# Patient Record
Sex: FEMALE | Race: WHITE | NOT HISPANIC OR LATINO
[De-identification: names, ages, dates, MRNs, and addresses within clinical notes are randomized per-mention and may not be internally consistent; named-entity substitution may affect disease eponyms.]

---

## 2017-02-15 ENCOUNTER — RX ONLY (OUTPATIENT)
Age: 57
Setting detail: RX ONLY
End: 2017-02-15

## 2017-03-14 ENCOUNTER — RX ONLY (OUTPATIENT)
Age: 57
Setting detail: RX ONLY
End: 2017-03-14

## 2017-05-17 ENCOUNTER — RX ONLY (OUTPATIENT)
Age: 57
Setting detail: RX ONLY
End: 2017-05-17

## 2017-05-23 ENCOUNTER — RX ONLY (OUTPATIENT)
Age: 57
Setting detail: RX ONLY
End: 2017-05-23

## 2017-06-27 ENCOUNTER — RX ONLY (OUTPATIENT)
Age: 57
Setting detail: RX ONLY
End: 2017-06-27

## 2019-01-29 PROBLEM — M54.50 LOW BACK PAIN: Status: ACTIVE | Noted: 2019-01-29

## 2019-01-29 PROBLEM — M54.16 LUMBAR RADICULOPATHY: Status: ACTIVE | Noted: 2019-01-29

## 2019-01-29 PROBLEM — Z98.1 S/P LUMBAR FUSION: Status: ACTIVE | Noted: 2019-01-29

## 2019-04-23 ENCOUNTER — OFFICE VISIT (OUTPATIENT)
Dept: NEUROLOGY | Facility: MEDICAL CENTER | Age: 59
End: 2019-04-23
Payer: MEDICAID

## 2019-04-23 VITALS
BODY MASS INDEX: 25.76 KG/M2 | OXYGEN SATURATION: 98 % | WEIGHT: 140 LBS | HEIGHT: 62 IN | DIASTOLIC BLOOD PRESSURE: 72 MMHG | SYSTOLIC BLOOD PRESSURE: 118 MMHG | HEART RATE: 72 BPM | TEMPERATURE: 97.7 F

## 2019-04-23 DIAGNOSIS — R20.2 PARESTHESIAS: ICD-10-CM

## 2019-04-23 PROCEDURE — 99205 OFFICE O/P NEW HI 60 MIN: CPT | Performed by: PSYCHIATRY & NEUROLOGY

## 2019-04-23 RX ORDER — MEDROXYPROGESTERONE ACETATE 2.5 MG/1
2.5 TABLET ORAL DAILY
COMMUNITY

## 2019-04-23 ASSESSMENT — PATIENT HEALTH QUESTIONNAIRE - PHQ9
5. POOR APPETITE OR OVEREATING: 0 - NOT AT ALL
CLINICAL INTERPRETATION OF PHQ2 SCORE: 3
SUM OF ALL RESPONSES TO PHQ QUESTIONS 1-9: 9

## 2019-04-23 NOTE — PROGRESS NOTES
CC: Right arm and leg paresthesias      HPI:    Siobhan Thrasher is a 58 y.o. female with past medical history of complicated migraine, and lumbar radiculopathy status post epidural steroid injection yesterday, who presents today in initial neurologic consultation for complaints of right arm and leg paresthesias. The patient was referred by their primary care provider, Christa Vargas M.D.      In January 2019, she developed left facial numbness that spread to the right side of her face over the course of a couple hours and then evolved to include her entire right side.  The numbness and tingling was associated with a throbbing sensation on the left side of her head.  Once the tingling resolved, throbbing also subsided.  She was evaluated at [facility] where a brain MRI apparently showed greater than expected white matter changes for her age.  Neither the reports or MRI images are available for my review today.    She reports a history of migraines which are preceded by blurry vision and spots in her visual fields. She has also experienced complicated migraines which consist of headache associated with left hand numbness and tingling.  She only gets migraines about once a year. When she does experience them, she gets very photosensitive. They can last 5 days at a time.     Her past medical history also includes eosinophilic esophagitis s/p biopsy last year which showed the presence of yeast.She was taken off of flonase and treated with diflucan. She had an esophageal stricture dilated around the same time. 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     Ms. Thrasher underwent lumbar fusion in 1990 from the L4-S1 levels performed by Dr. Vernon Triana. The following year the hardware had to be removed, and she was overall pain-free until 2017 and in 2018 she underwent L3-4 discectomy by Dr. Deuce vargas in addition to an interbody cage fusion and right-sided L3-4 lateral body fusion with L4  pedicle screw fixation.    She has had both hypothyroidism and hyperthyroidism. She has a thyroid ultrasound scheduled for this Thursday.     Ms. Thrasher denies any episodes of painful vision loss.  Is difficult for her to identify specific neurological symptoms which may have been MS related because she has had so many back issues as noted above.      ROS:   Constitutional: No fevers or chills.  Eyes: No blurry vision or eye pain.  ENT: No dysphagia or hearing loss.  Respiratory: No cough or shortness of breath.  Cardiovascular: No chest pain,+ palpitations due to hypothyroidism.  GI: No nausea, vomiting, or diarrhea.  : No urinary incontinence or dysuria.  Musculoskeletal: No joint swelling or arthralgias.  Skin: No skin rashes.  Neuro: + Headaches, denies dizziness or tremors.  Endocrine: No heat or cold intolerance. No polydipsia or polyuria.  Psych: No depression or anxiety.  Heme/Lymph: No easy bruising or swollen lymph nodes.      Past Medical History:   Past Medical History:   Diagnosis Date   • Hyperlipidemia 6/27/2012   • Hypothyroidism 6/27/2012   • Anxiety 6/27/2012   • Seasonal allergies 6/27/2012   • GERD (gastroesophageal reflux disease) 6/27/2012       Past Surgical History:   Past Surgical History:   Procedure Laterality Date   • BLOCK SELECTIVE NERVE Right 4/18/2019    Procedure: Right L3 selective nerve root block under fluoroscopy;  Surgeon: Imer Chang D.O.;  Location: SURGERY [facility];  Service: Orthopedics   • CHOLECYSTECTOMY     • LAMINOTOMY     • LUMBAR FUSION POSTERIOR     • MENISCUS REPAIR Left        Social History:   Social History     Social History   • Marital status: Single     Spouse name: N/A   • Number of children: N/A   • Years of education: N/A     Occupational History   • Not on file.     Social History Main Topics   • Smoking status: Never Smoker   • Smokeless tobacco: Never Used   • Alcohol use No      Comment: rare   • Drug use: No   • Sexual activity: Yes      "Partners: Male     Other Topics Concern   • Not on file     Social History Narrative   • No narrative on file       Family History:   Family History   Problem Relation Age of Onset   • Dementia Mother         Alzheimer's Type   • Heart Disease Mother         AAA   • Cancer Father         Lung   • Heart Disease Sister    • Thyroid Sister    • Diabetes Sister    • Rheumatologic Disease Sister         RA   • Heart Disease Sister    • Alcohol/Drug Sister    • No Known Problems Daughter        Allergies:   Allergies   Allergen Reactions   • Latex Anaphylaxis   • Penicillins Anaphylaxis   • Avelox [Moxifloxacin Hcl In Nacl] Swelling   • Azithromycin Diarrhea and Vomiting   • Codeine Vomiting   • Dicloxacillin      Facial and tongue swelling   • Neosporin [Neomycin-Bacitracin-Polymyxin]        Physical Exam:     Ambulatory Vitals  Encounter Vitals  Temperature: 36.5 °C (97.7 °F)  Temp src: Temporal  Blood Pressure: 118/72  Pulse: 72  Pulse Oximetry: 98 %  Weight: 63.5 kg (140 lb)  Height: 156.2 cm (5' 1.5\")  BMI (Calculated): 26.02    Constitutional: Well-developed, well-nourished, good hygiene. Appears stated age.  Cardiovascular: RRR, with no murmurs, rubs or gallops. No carotid bruits.   Respiratory: Lungs CTA B/L, no W/R/R.   Abdomen: Soft Non-tender to Palpation. Non-distended.  Extremities: No peripheral edema, pedal pulses intact.   Skin: Warm, dry, intact. No rashes observed.  Eyes: Sclera anicteric   Funduscopic: Optic discs flat with no evidence of papilledema or pallor.   Neurologic:   Mental Status: Awake, alert, oriented x 3.   Speech: Fluent with normal prosody.   Memory: Able to recall recent and remote events accurately.    Concentration: Attentive. Able to focus on history and follow multi-step commands.              Fund of Knowledge: Appropriate   Cranial Nerves:    CN II: PERRL. No afferent pupillary defect.    CN III, IV, VI: Good eye closure, EOMI.     CN V: Facial sensation intact and symmetric. "     CN VII: No facial asymmetry.     CN VIII: Hearing intact.     CN IX and X: Palate elevates symmetrically. Normal gag reflex.    CN XI: Symmetric shoulder shrug.     CN XII: Tongue midline.    Sensory: Intact light touch, vibration and temperature.    Coordination: No evidence of past-pointing on finger to nose testing, no dysdiadochokinesia. Heel to shin intact.             DTR's: 2+ throughout without clonus.    Babinski: Toes downgoing bilaterally.   Romberg: Negative.   Movements: No tremors observed.   Musculoskeletal:    Strength: 5/5 in upper and lower extremities bilaterally.   Gait: Steady, narrow based.    Tone: Normal bulk and tone.   Joints: No swelling.     Labs Reviewed:  WBC 8.1  Hgb 14.7  Hct 44.1  MCV 90  Platelets 440  Eos 1.3  TSH 0.219  Creat 0.86  Na 139  Cl 104  BUN 18  Total bilirubin 0.3  Alkaline phosphatase 110  AST 21  ALT 16  Total cholesterol 200  Triglycerides 41  HDL 67      Imaging:   Only report of Lumbar MRI from 3/6/19 available for review today.  Impression:  1.  Mildly progressive transitional spondylosis and stable right retrolisthesis at the L2-3 level.  Borderline to mild acquired central canal narrowing.  2.  Significant improvement in decompression of the spinal canal post fusion and discectomy at L3-4.  Persistent mild bilateral neural foraminal narrowing.  3.  Stable fusion and laminectomy at L4-5, L5-S1 levels.    Assessment/Plan:  Paresthesias  Ms. Thrasher is a 58-year-old woman with a past medical history of eosinophilic esophagitis, hypothyroidism and hyperthyroidism, anxiety, lumbar fusion, discectomy, and laminectomy procedures, and complicated migraines, who experienced left facial numbness and tingling which spread to the right side of her face and then involved her entire right side.  This was associated with a throbbing headache which resolved after her tingling resolved.  Brain MRI was reportedly abnormal and concerning for demyelination.  The MRIs  were not available for my review today.  However, her history, while concerning for possible autoimmune disease in the form of the eosinophilic esophagitis, does not sound very typical for multiple sclerosis.  I suspect that she may have had a complicated migraine on the day in question.  I have asked her to obtain her MRIs on CD for my direct review.  If she did not have an enhancing lesion to explain her symptoms at the time of the episode, I would not characterize this as a first MS attack.  We will reviewed the MRIs together at her follow-up appointment.    Plan:  1.  She will obtain her MRI images on CD and bring them back for review      Greater than 50% of this 60 minute face to face encounter was devoted to disease state counseling on multiple sclerosis and coordination of care.  Additional time was spent on review of outside labs, outside medical records, and outside imaging reports.  Please see above assessment and plan for discussion.       Marlin Peterson D.O., M.P.H  MS specialist.   Board Certified Neurologist.  Neurology Clerkship Director, [facility].    Neurology,  [facility].   Tel: [phone]  Fax: [phone]

## 2019-04-23 NOTE — ASSESSMENT & PLAN NOTE
Ms. Thrasher is a 58-year-old woman with a past medical history of eosinophilic esophagitis, hypothyroidism and hyperthyroidism, anxiety, lumbar fusion, discectomy, and laminectomy procedures, and complicated migraines, who experienced left facial numbness and tingling which spread to the right side of her face and then involved her entire right side.  This was associated with a throbbing headache which resolved after her tingling resolved.  Brain MRI was reportedly abnormal and concerning for demyelination.  The MRIs were not available for my review today.  However, her history, while concerning for possible autoimmune disease in the form of the eosinophilic esophagitis, does not sound very typical for multiple sclerosis.  I suspect that she may have had a complicated migraine on the day in question.  I have asked her to obtain her MRIs on CD for my direct review.  If she did not have an enhancing lesion to explain her symptoms at the time of the episode, I would not characterize this as a first MS attack.  We will reviewed the MRIs together at her follow-up appointment.    Plan:  1.  She will obtain her MRI images on CD and bring them back for review

## 2019-05-21 ENCOUNTER — OFFICE VISIT (OUTPATIENT)
Dept: NEUROLOGY | Facility: MEDICAL CENTER | Age: 59
End: 2019-05-21
Payer: MEDICAID

## 2019-05-21 ENCOUNTER — HOSPITAL ENCOUNTER (OUTPATIENT)
Dept: LAB | Facility: MEDICAL CENTER | Age: 59
End: 2019-05-21
Attending: PSYCHIATRY & NEUROLOGY
Payer: MEDICAID

## 2019-05-21 VITALS
TEMPERATURE: 97.4 F | SYSTOLIC BLOOD PRESSURE: 104 MMHG | BODY MASS INDEX: 25.76 KG/M2 | HEIGHT: 62 IN | DIASTOLIC BLOOD PRESSURE: 68 MMHG | OXYGEN SATURATION: 96 % | WEIGHT: 140 LBS | HEART RATE: 75 BPM

## 2019-05-21 DIAGNOSIS — R90.89 ABNORMAL BRAIN MRI: ICD-10-CM

## 2019-05-21 DIAGNOSIS — R20.2 PARESTHESIAS: ICD-10-CM

## 2019-05-21 LAB
APTT PPP: 34.1 SEC (ref 24.7–36)
HAV IGM SERPL QL IA: NEGATIVE
HBV CORE IGM SER QL: NEGATIVE
HBV SURFACE AG SER QL: NEGATIVE
HCV AB SER QL: NEGATIVE
INR PPP: 0.97 (ref 0.87–1.13)
PROTHROMBIN TIME: 13 SEC (ref 12–14.6)
TREPONEMA PALLIDUM IGG+IGM AB [PRESENCE] IN SERUM OR PLASMA BY IMMUNOASSAY: NON REACTIVE
VIT B12 SERPL-MCNC: 495 PG/ML (ref 211–911)

## 2019-05-21 PROCEDURE — 82607 VITAMIN B-12: CPT

## 2019-05-21 PROCEDURE — 85730 THROMBOPLASTIN TIME PARTIAL: CPT

## 2019-05-21 PROCEDURE — 86431 RHEUMATOID FACTOR QUANT: CPT

## 2019-05-21 PROCEDURE — 86618 LYME DISEASE ANTIBODY: CPT

## 2019-05-21 PROCEDURE — 80074 ACUTE HEPATITIS PANEL: CPT

## 2019-05-21 PROCEDURE — 36415 COLL VENOUS BLD VENIPUNCTURE: CPT

## 2019-05-21 PROCEDURE — 86780 TREPONEMA PALLIDUM: CPT

## 2019-05-21 PROCEDURE — 86235 NUCLEAR ANTIGEN ANTIBODY: CPT | Mod: 91

## 2019-05-21 PROCEDURE — 86160 COMPLEMENT ANTIGEN: CPT

## 2019-05-21 PROCEDURE — 86038 ANTINUCLEAR ANTIBODIES: CPT

## 2019-05-21 PROCEDURE — 85610 PROTHROMBIN TIME: CPT

## 2019-05-21 PROCEDURE — 99214 OFFICE O/P EST MOD 30 MIN: CPT | Performed by: PSYCHIATRY & NEUROLOGY

## 2019-05-21 NOTE — PROGRESS NOTES
CC: Right arm and leg paresthesias and abnormal brain MRI      HPI:    Siobhan Thrasher is a pleasant 58 y.o. female who presents today in Neurological follow up for an episode in January 2019 of right sided paresthesias found to have greater than expected white matter changes on brain MRI imaging.  She was seen on April 23, 2019 (please refer to my office notes from the same day for more in-depth history).  She did not have the MRI images for review at that visit. Today she returns with the CDs.    Ms. Thrasher continues has continue to experience headaches since her last visit. Motrin gives incomplete relief. The intensity of the pain is 4/10. She continues to experience quite a bit of neck and low back pain and thinks her headaches may originate from her neck. She also notes twitching of her left eyelid. She has not had any additional stroke-like episodes of numbness similar to that which prompted her ER visit in March 2019.       Review of Systems   Constitutional: Negative for chills and fever.   HENT: Negative for hearing loss and sore throat.    Eyes: Negative for blurred vision, double vision and pain.   Respiratory: Negative for cough and shortness of breath.    Cardiovascular: Negative for chest pain and palpitations.   Gastrointestinal: Negative for nausea and vomiting.   Genitourinary: Negative for dysuria and frequency.   Musculoskeletal: Positive for back pain and neck pain.   Skin: Negative for itching and rash.   Neurological: Positive for headaches. Negative for tremors.   Psychiatric/Behavioral: Positive for memory loss. Negative for depression.           Past Medical History:   Past Medical History:   Diagnosis Date   • Hyperlipidemia 6/27/2012   • Hypothyroidism 6/27/2012   • Anxiety 6/27/2012   • Seasonal allergies 6/27/2012   • GERD (gastroesophageal reflux disease) 6/27/2012       Past Surgical History:   Past Surgical History:   Procedure Laterality Date   • BLOCK SELECTIVE NERVE Right 4/18/2019  "   Procedure: Right L3 selective nerve root block under fluoroscopy;  Surgeon: Imer Chang D.O.;  Location: SURGERY [facility];  Service: Orthopedics   • CHOLECYSTECTOMY     • LAMINOTOMY     • LUMBAR FUSION POSTERIOR     • MENISCUS REPAIR Left        Social History:   Social History     Social History   • Marital status: Single     Spouse name: N/A   • Number of children: N/A   • Years of education: N/A     Occupational History   • Not on file.     Social History Main Topics   • Smoking status: Never Smoker   • Smokeless tobacco: Never Used   • Alcohol use No      Comment: rare   • Drug use: No   • Sexual activity: Yes     Partners: Male     Other Topics Concern   • Not on file     Social History Narrative   • No narrative on file       Family History:   Family History   Problem Relation Age of Onset   • Dementia Mother         Alzheimer's Type   • Heart Disease Mother         AAA   • Cancer Father         Lung   • Heart Disease Sister    • Thyroid Sister    • Diabetes Sister    • Rheumatologic Disease Sister         RA   • Heart Disease Sister    • Alcohol/Drug Sister    • No Known Problems Daughter        Allergies:   Allergies   Allergen Reactions   • Latex Anaphylaxis   • Penicillins Anaphylaxis   • Avelox [Moxifloxacin Hcl In Nacl] Swelling   • Azithromycin Diarrhea and Vomiting   • Codeine Vomiting   • Dicloxacillin      Facial and tongue swelling   • Neosporin [Neomycin-Bacitracin-Polymyxin]        Physical Exam:     Ambulatory Vitals  Encounter Vitals  Temperature: 36.3 °C (97.4 °F)  Temp src: Temporal  Blood Pressure: 104/68  Pulse: 75  Pulse Oximetry: 96 %  Weight: 63.5 kg (140 lb)  Height: 156.2 cm (5' 1.5\")  BMI (Calculated): 26.02  Constitutional: Well-developed, well-nourished, good hygiene. Appears stated age.  Cardiovascular: RRR, with no murmurs, rubs or gallops. No carotid bruits.   Respiratory: Lungs CTA B/L, no W/R/R.   Abdomen: Soft Non-tender to Palpation. Non-distended.  Extremities: No " peripheral edema, pedal pulses intact.   Skin: Warm, dry, intact. No rashes observed.  Eyes: Sclera anicteric. No nystagmus observed.   Neurologic:   Mental Status: Awake and alert.    Speech: Speech is fluent with normal prosody.   .           Fund of Knowledge: Appropriate   Cranial Nerves:    CN II: Pupils are equal and react appropriately. No APD noted.    CN III, IV, VI: Good eye closure. Extraocular movements are intact.     CN V: Facial sensation is intact and symmetric.     CN VII: No evidence of facial droop.     CN VIII: Hearing is grossly intact.    CN IX and X: Palate elevates symmetrically.    CN XI: Shoulder shrug is symmetric.     CN XII: Tongue is midline.   Sensory: Sensation is intact to vibration and temperature.    Coordination: There is no discoordination detected with finger tapping or finger to nose testing.        DTR's: Reflexes are 2+ and symmetrical.   Babinski: Toes are downgoing bilaterally.    Romberg: Deferred.   Movements: No tremors noted.  Musculoskeletal:    Strength:   Deltoids      R 5/5 L 5/5  Biceps        R 5/5 L 5/5  Triceps       R 5/5 L 5/5  Wrist Ext    R 5/5 L 5/5  Finger Flex R 5/5 L 5/5  Finger Ext   R 5/5 L 5/5  Hip Flex      R 5/5 L 5/5  Knee Flex   R 5/5 L 5/5  Knee Ext    R 5/5 L 5/5  Ankle DF    R 5/5 L 5/5  Ankle PF    R 5/5 L 5/5   Gait: Gait is narrow based and steady.    Tone: Normal bulk and tone.    Joints: No joint swelling.   Psychiatric: Mood and affect are appropriate.       Labs Reviewed:  Today I reviewed the patient's most recent MRI images with her in the examination room. I explained basic terminology of MRI's, verbalized my assessment, and answered her questions.     Imaging reviewed:   MRI of the brain with and without contrast from February 4, 2019  No acute intracranial abnormality.  Chronic periventricular white matter changes, which are somewhat out of proportion for age and therefore raising concern for an underlying demyelinating  disease.    MRI of the cervical spine with out contrast from June 7, 2018  Impression:  1.  Moderate to advanced multilevel cervical spondylosis.  This causes mild to moderate canal stenosis from C3-C4 through C5-C6 with effacement of the anterior cord but no myelomalacia.  2.  Uncovertebral and facet arthrosis are noted at multiple levels resulting in varying degrees of foraminal stenosis.    Assessment/Plan:  Abnormal brain MRI  Ms. Thrasher is a 57 yo woman with pmhx of eosinophilic esophagitis, hypothyroidism/hyperthyroidism, anxiety, lumbar fusion, discectomy, and laminectomy procedures, and likely complicated migraine, who experienced left facial numbness and tingling spreading from her right face into the entire right side of her body in February 2019.    Today we reviewed her outside MRI imaging which she has provided on CD, performed at [facility].  Her brain MRI from February 4, 2019 showed periventricular white matter changes considered somewhat out of proportion for age raising the concern for possible underlying demyelinating disease.  Cervical spine MRI from June 7, 2018 did not show any evidence of demyelination.  There were no thoracic spine MRIs to evaluate.    The main concern is that the periventricular white matter changes on her MRI imaging represents multiple sclerosis and whether the episode of facial and right-sided numbness represents a demyelinating event or clinical attack.  At this time it is not possible to say.  While I see an increase in the periventricular white matter changes over time, on serial MRI imaging, the diagnosis is not certain based solely on the imaging.  An alternative consideration would be CADASIL given her strokelike episode, white matter changes, and family history of dementia.  I recommended we pursue testing of the notch 3 gene mutation while we also perform additional testing for multiple sclerosis.  I recommended a lumbar puncture to evaluate for  the presence of oligoclonal bands or other markers of demyelination.  I also recommended thoracic spine MRI imaging.  Cord lesions would of course point toward a demyelinating process such as MS.    I have taken her MRI CDs to be loaded into our radiology system.  We will ask for them to be returned once loaded.     Plan:  1.  MRI of the thoracic spine with and without contrast ordered today  2.  Lumbar puncture to assess for cell counts, protein, glucose, multiple sclerosis profile, and oligoclonal bands.  3.  We will request the notch 3 mutation through [employer].  She would likely qualify for financial assistance through the [employer] program.      Marlin Peterson D.O., M.P.H  MS specialist.   Board Certified Neurologist.  Neurology Clerkship Director, [facility].    Neurology,  [facility].   Tel: [phone]  Fax: [phone]

## 2019-05-21 NOTE — ASSESSMENT & PLAN NOTE
Ms. Thrasher is a 59 yo woman with pmhx of eosinophilic esophagitis, hypothyroidism/hyperthyroidism, anxiety, lumbar fusion, discectomy, and laminectomy procedures, and likely complicated migraine, who experienced left facial numbness and tingling spreading from her right face into the entire right side of her body in February 2019.    Today we reviewed her outside MRI imaging which she has provided on CD, performed at [facility].  Her brain MRI from February 4, 2019 showed periventricular white matter changes considered somewhat out of proportion for age raising the concern for possible underlying demyelinating disease.  Cervical spine MRI from June 7, 2018 did not show any evidence of demyelination.  There were no thoracic spine MRIs to evaluate.    The main concern is that the periventricular white matter changes on her MRI imaging represents multiple sclerosis and whether the episode of facial and right-sided numbness represents a demyelinating event or clinical attack.  At this time it is not possible to say.  While I see an increase in the periventricular white matter changes over time, on serial MRI imaging, the diagnosis is not certain based solely on the imaging.  An alternative consideration would be CADASIL given her strokelike episode, white matter changes, and family history of dementia.  I recommended we pursue testing of the notch 3 gene mutation while we also perform additional testing for multiple sclerosis.  I recommended a lumbar puncture to evaluate for the presence of oligoclonal bands or other markers of demyelination.  I also recommended thoracic spine MRI imaging.  Cord lesions would of course point toward a demyelinating process such as MS.    I have taken her MRI CDs to be loaded into our radiology system.  We will ask for them to be returned once loaded.     Plan:  1.  MRI of the thoracic spine with and without contrast ordered today  2.  Lumbar puncture to assess for cell  counts, protein, glucose, multiple sclerosis profile, and oligoclonal bands.  3.  We will request the notch 3 mutation through [employer].  She would likely qualify for financial assistance through the [employer] program.

## 2019-05-21 NOTE — ASSESSMENT & PLAN NOTE
Ms. Thrasher is a 58-year-old woman with a past medical history of eosinophilic esophagitis, hypothyroidism and hyperthyroidism, anxiety, lumbar fusion, discectomy/laminectomy procedures, and complicated migraines, who experienced left facial numbness and tingling which spread to the right side of her face and then involved her entire right side.  This was associated with a throbbing headache which resolved after her tingling resolved.  Brain MRI was reportedly abnormal and concerning for demyelination.  I suspect that she may have had a complicated migraine on the day in question.       Plan:  1.

## 2019-05-22 DIAGNOSIS — R90.89 ABNORMAL BRAIN MRI: ICD-10-CM

## 2019-05-22 ASSESSMENT — ENCOUNTER SYMPTOMS
NECK PAIN: 1
COUGH: 0
SORE THROAT: 0
NAUSEA: 0
PALPITATIONS: 0
BLURRED VISION: 0
DOUBLE VISION: 0
EYE PAIN: 0
SHORTNESS OF BREATH: 0
DEPRESSION: 0
CHILLS: 0
TREMORS: 0
VOMITING: 0
MEMORY LOSS: 1
BACK PAIN: 1
FEVER: 0
HEADACHES: 1

## 2019-05-23 LAB
B BURGDOR AB SER IA-ACNC: 0.27 LIV (ref 0–1.2)
C3 SERPL-MCNC: 182 MG/DL (ref 88–201)
C4 SERPL-MCNC: 68 MG/DL (ref 10–40)
NUCLEAR IGG SER QL IA: ABNORMAL
RHEUMATOID FACT SER NEPH-ACNC: <10 IU/ML (ref 0–14)

## 2019-05-24 ENCOUNTER — TELEPHONE (OUTPATIENT)
Dept: NEUROLOGY | Facility: MEDICAL CENTER | Age: 59
End: 2019-05-24

## 2019-05-24 NOTE — TELEPHONE ENCOUNTER
Pt called asking for a code for notch 3 mutation through [facility].  She called [facility] and they want to know the code for it that dr Peterson will use.

## 2019-05-28 NOTE — TELEPHONE ENCOUNTER
LORENE Batista, Med Ass't   Caller: Unspecified (4 days ago,  3:38 PM)             The code is 1175 through [employer].   Dr. Peterson      Called let pt know per dr Peterson.

## 2019-05-30 LAB
ENA SS-B IGG SER IA-ACNC: 0 AU/ML (ref 0–40)
SSA52 R0ENA AB IGG Q0420: 1 AU/ML (ref 0–40)
SSA60 R0ENA AB IGG Q0419: 1 AU/ML (ref 0–40)

## 2019-06-06 ENCOUNTER — TELEPHONE (OUTPATIENT)
Dept: NEUROLOGY | Facility: MEDICAL CENTER | Age: 59
End: 2019-06-06

## 2019-06-06 NOTE — TELEPHONE ENCOUNTER
Marilou from imaging called states pt has LP scheduled on 6/11/19 and need dr Peterson to order CBC as stat for pt please

## 2019-06-10 DIAGNOSIS — E78.5 HYPERLIPIDEMIA, UNSPECIFIED HYPERLIPIDEMIA TYPE: ICD-10-CM

## 2019-06-11 ENCOUNTER — HOSPITAL ENCOUNTER (OUTPATIENT)
Facility: MEDICAL CENTER | Age: 59
End: 2019-06-11
Attending: PSYCHIATRY & NEUROLOGY
Payer: MEDICAID

## 2019-06-11 ENCOUNTER — HOSPITAL ENCOUNTER (OUTPATIENT)
Dept: RADIOLOGY | Facility: MEDICAL CENTER | Age: 59
End: 2019-06-11

## 2019-06-11 ENCOUNTER — HOSPITAL ENCOUNTER (OUTPATIENT)
Dept: RADIOLOGY | Facility: MEDICAL CENTER | Age: 59
End: 2019-06-11
Attending: PSYCHIATRY & NEUROLOGY
Payer: MEDICAID

## 2019-06-11 ENCOUNTER — HOSPITAL ENCOUNTER (OUTPATIENT)
Dept: LAB | Facility: MEDICAL CENTER | Age: 59
End: 2019-06-11
Attending: PSYCHIATRY & NEUROLOGY
Payer: MEDICAID

## 2019-06-11 DIAGNOSIS — R90.89 ABNORMAL BRAIN MRI: ICD-10-CM

## 2019-06-11 DIAGNOSIS — E78.5 HYPERLIPIDEMIA, UNSPECIFIED HYPERLIPIDEMIA TYPE: ICD-10-CM

## 2019-06-11 LAB
APTT PPP: 33.5 SEC (ref 24.7–36)
BASOPHILS # BLD AUTO: 2.3 % (ref 0–1.8)
BASOPHILS # BLD: 0.17 K/UL (ref 0–0.12)
BURR CELLS/RBC NFR CSF MANUAL: 0 %
CLARITY CSF: CLEAR
COLOR CSF: COLORLESS
COLOR SPUN CSF: COLORLESS
EOSINOPHIL # BLD AUTO: 1.57 K/UL (ref 0–0.51)
EOSINOPHIL NFR BLD: 21.1 % (ref 0–6.9)
ERYTHROCYTE [DISTWIDTH] IN BLOOD BY AUTOMATED COUNT: 41.2 FL (ref 35.9–50)
GLUCOSE CSF-MCNC: 64 MG/DL (ref 40–80)
HCT VFR BLD AUTO: 41.5 % (ref 37–47)
HGB BLD-MCNC: 14.2 G/DL (ref 12–16)
IMM GRANULOCYTES # BLD AUTO: 0.02 K/UL (ref 0–0.11)
IMM GRANULOCYTES NFR BLD AUTO: 0.3 % (ref 0–0.9)
INR PPP: 0.96 (ref 0.87–1.13)
LYMPHOCYTES # BLD AUTO: 1.67 K/UL (ref 1–4.8)
LYMPHOCYTES NFR BLD: 22.4 % (ref 22–41)
LYMPHOCYTES NFR CSF: 69 %
MCH RBC QN AUTO: 30.7 PG (ref 27–33)
MCHC RBC AUTO-ENTMCNC: 34.2 G/DL (ref 33.6–35)
MCV RBC AUTO: 89.6 FL (ref 81.4–97.8)
MONOCYTES # BLD AUTO: 0.48 K/UL (ref 0–0.85)
MONOCYTES NFR BLD AUTO: 6.5 % (ref 0–13.4)
MONONUC CELLS NFR CSF: 31 %
NEUTROPHILS # BLD AUTO: 3.53 K/UL (ref 2–7.15)
NEUTROPHILS NFR BLD: 47.4 % (ref 44–72)
NRBC # BLD AUTO: 0 K/UL
NRBC BLD-RTO: 0 /100 WBC
PLATELET # BLD AUTO: 369 K/UL (ref 164–446)
PMV BLD AUTO: 9.5 FL (ref 9–12.9)
PROT CSF-MCNC: 54 MG/DL (ref 15–45)
PROTHROMBIN TIME: 12.9 SEC (ref 12–14.6)
RBC # BLD AUTO: 4.63 M/UL (ref 4.2–5.4)
RBC # CSF: 1 CELLS/UL
SPECIMEN VOL CSF: 12.9 ML
TUBE # CSF: 3
TUBE # CSF: 3
WBC # BLD AUTO: 7.4 K/UL (ref 4.8–10.8)
WBC # CSF: 1 CELLS/UL (ref 0–10)

## 2019-06-11 PROCEDURE — 82784 ASSAY IGA/IGD/IGG/IGM EACH: CPT

## 2019-06-11 PROCEDURE — 85610 PROTHROMBIN TIME: CPT

## 2019-06-11 PROCEDURE — 82040 ASSAY OF SERUM ALBUMIN: CPT

## 2019-06-11 PROCEDURE — 86617 LYME DISEASE ANTIBODY: CPT

## 2019-06-11 PROCEDURE — 89051 BODY FLUID CELL COUNT: CPT

## 2019-06-11 PROCEDURE — 84157 ASSAY OF PROTEIN OTHER: CPT

## 2019-06-11 PROCEDURE — 86780 TREPONEMA PALLIDUM: CPT

## 2019-06-11 PROCEDURE — 82945 GLUCOSE OTHER FLUID: CPT

## 2019-06-11 PROCEDURE — 85730 THROMBOPLASTIN TIME PARTIAL: CPT

## 2019-06-11 PROCEDURE — 85025 COMPLETE CBC W/AUTO DIFF WBC: CPT

## 2019-06-11 PROCEDURE — 86235 NUCLEAR ANTIGEN ANTIBODY: CPT | Mod: 91

## 2019-06-11 PROCEDURE — 83916 OLIGOCLONAL BANDS: CPT

## 2019-06-11 PROCEDURE — 36415 COLL VENOUS BLD VENIPUNCTURE: CPT

## 2019-06-11 PROCEDURE — 82042 OTHER SOURCE ALBUMIN QUAN EA: CPT

## 2019-06-11 PROCEDURE — 62270 DX LMBR SPI PNXR: CPT

## 2019-06-12 LAB — TREPONEMA PALLIDUM IGG+IGM AB [PRESENCE] IN SERUM OR PLASMA BY IMMUNOASSAY: NON REACTIVE

## 2019-06-13 LAB
ENA SS-B IGG SER IA-ACNC: 0 AU/ML (ref 0–40)
SSA52 R0ENA AB IGG Q0420: 1 AU/ML (ref 0–40)
SSA60 R0ENA AB IGG Q0419: 2 AU/ML (ref 0–40)

## 2019-06-14 LAB
ALB CSF/SERPL: 6.9 RATIO (ref 0–9)
ALBUMIN CSF-MCNC: 32 MG/DL (ref 0–35)
ALBUMIN SERPL-MCNC: 4630 MG/DL (ref 3500–5200)
B BURGDOR IGG SER QL IB: NEGATIVE
B BURGDOR IGM SER QL IB: NEGATIVE
IGG CSF-MCNC: 1.6 MG/DL (ref 0–6)
IGG SERPL-MCNC: 684 MG/DL (ref 768–1632)
IGG SYNTH RATE SER+CSF CALC-MRATE: <0 MG/D
IGG/ALB CLEAR SER+CSF-RTO: 0.34 RATIO (ref 0.28–0.66)
IGG/ALB CSF: 0.05 RATIO (ref 0.09–0.25)
OLIGOCLONAL BANDS CSF ELPH-IMP: ABNORMAL
OLIGOCLONAL BANDS CSF ELPH-IMP: NEGATIVE
OLIGOCLONAL BANDS CSF IEF: 0 BANDS (ref 0–1)

## 2019-07-22 ENCOUNTER — OFFICE VISIT (OUTPATIENT)
Dept: NEUROLOGY | Facility: MEDICAL CENTER | Age: 59
End: 2019-07-22
Payer: MEDICAID

## 2019-07-22 VITALS
TEMPERATURE: 97.4 F | BODY MASS INDEX: 25.76 KG/M2 | OXYGEN SATURATION: 97 % | WEIGHT: 140 LBS | HEIGHT: 62 IN | HEART RATE: 92 BPM | DIASTOLIC BLOOD PRESSURE: 78 MMHG | SYSTOLIC BLOOD PRESSURE: 114 MMHG

## 2019-07-22 DIAGNOSIS — R90.89 ABNORMAL BRAIN MRI: ICD-10-CM

## 2019-07-22 DIAGNOSIS — Z82.0 FAMILY HISTORY OF ALZHEIMER'S DISEASE: ICD-10-CM

## 2019-07-22 PROCEDURE — 99215 OFFICE O/P EST HI 40 MIN: CPT | Performed by: PSYCHIATRY & NEUROLOGY

## 2019-07-22 NOTE — PROGRESS NOTES
CC: Abnormal Brain MRI.    HPI:   Ms. Siobhan Thrasher is a 59 yo F who presents in outpatient neurologic follow up for an abnormal brain MRI and stroke-like episodes. She was last seen on 5/21/19. For more comprehensive account of symptoms leading to consultation, please see my progress note from 4/23/19. Since her last visit, Ms. Thrasher underwent thoracic spine MRI at [facility]. The report states that there is no my demyelination seen.      2005 she had hearing loss after a concussion during which she bled out of her left ear. She received bilateral hearing aids.     She continues to experience headaches which seem cervicogenic in etiology with pain originating at the base of her occipital area radiating over her head and behind the eyes. Frequency is weekly. She takes Ibuprofen which does not help. She denies clear photophobia, phonophobia, or nausea. She has experienced migraines in the past, but has not had a migraine for several years. At age 20, she had migraine with aura. These recurred in 5631-8904. There has only been one very severe headache.     She denies consistent memory impairment. At work, in the past, she would have difficulty remembering what she needed to do and would have to write everything down. 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 She is mostly concerned because she has a family history of what she believes to be Alzheimer's dementia in her mother and grandmother and is convinced she may develop this herself.     Review of Systems   Constitutional: Negative for chills and fever.   Eyes: Negative for blurred vision and double vision.   Respiratory: Negative for shortness of breath.    Cardiovascular: Negative for chest pain.   Gastrointestinal: Negative for nausea and vomiting.   Musculoskeletal: Positive for back pain.   Skin: Negative for rash.   Neurological: Positive for headaches.   Psychiatric/Behavioral: Positive for memory loss.    All other ROS were negative except.        Current Outpatient Prescriptions:   •  Multiple Vitamins-Minerals (HAIR/SKIN/NAILS/BIOTIN PO), Take  by mouth., Disp: , Rfl:   •  estradiol cypionate (DEPO-ESTRADIOL) 5 MG/ML Oil, by Intramuscular route Once., Disp: , Rfl:   •  medroxyPROGESTERone (PROVERA) 2.5 MG Tab, Take 2.5 mg by mouth every day., Disp: , Rfl:   •  acyclovir (ZOVIRAX) 200 MG Cap, Take 400 mg by mouth 2 Times a Day., Disp: , Rfl:   •  albuterol (PROVENTIL) 2.5mg/3ml NEBU, 3 mL by Nebulization route every 6 hours as needed., Disp: 75 mL, Rfl: 2  •  amitriptyline (ELAVIL) 50 MG TABS, Take 50 mg by mouth 2 Times a Day., Disp: , Rfl:   •  ibuprofen (MOTRIN) 800 MG TABS, Take 800 mg by mouth every 6 hours as needed., Disp: , Rfl:   •  Cetirizine HCl (ZYRTEC ALLERGY PO), Take  by mouth., Disp: , Rfl:   •  gemfibrozil (LOPID) 600 MG TABS, Take 600 mg by mouth 2 times a day., Disp: , Rfl:   •  alprazolam (XANAX) 0.5 MG TABS, Take 0.5 mg by mouth 1 time daily as needed., Disp: , Rfl:   •  doxycycline (VIBRAMYCIN) 100 MG TABS, Take 1 tablet twice daily for 10 days (Patient not taking: Reported on 4/23/2019), Disp: 20 Tab, Rfl: 0  •  ipratropium (ATROVENT) 0.02 % SOLN, 2.5 mL by Nebulization route 4 times a day. (Patient not taking: Reported on 4/23/2019), Disp: 50 Each, Rfl: 1  •  fluticasone (FLONASE) 50 MCG/ACT nasal spray, Spray 2 Sprays in nose every day. Each Nostril (Patient not taking: Reported on 4/23/2019), Disp: 16 g, Rfl: 3  •  albuterol (VENTOLIN HFA) 108 (90 BASE) MCG/ACT AERS, Inhale 2 Puffs by mouth every four hours as needed., Disp: , Rfl:   •  levothyroxine (SYNTHROID) 25 MCG TABS, Take 25 mcg by mouth every day., Disp: , Rfl:   •  docosahexanoic acid (OMEGA 3 FA) 1000 MG CAPS, Take 1,000 mg by mouth 2 Times a Day., Disp: , Rfl:   •  omeprazole (PRILOSEC) 20 MG CPDR, Take 20 mg by mouth every day., Disp: , Rfl:     Physical Examination:  Ambulatory Vitals  Encounter Vitals  Temperature: 36.3 °C  "(97.4 °F)  Temp src: Temporal  Blood Pressure: 114/78  Pulse: 92  Pulse Oximetry: 97 %  Weight: 63.5 kg (140 lb)  Height: 156.2 cm (5' 1.5\")  BMI (Calculated): 26.02    Constitutional: Well-developed, well-nourished, good hygiene. Appears stated age.  Cardiovascular: RRR, with no murmurs, rubs or gallops. No carotid bruits.   Respiratory: Lungs CTA B/L, no W/R/R.   Abdomen: Soft Non-tender to Palpation. Non-distended.  Extremities: No peripheral edema, pedal pulses intact.   Skin: Warm, dry, intact. No rashes observed.  Eyes: Sclera anicteric. No nystagmus observed.   Neurologic:   Mental Status: Awake and alert.    Speech: Speech is fluent with normal prosody.   .           Fund of Knowledge: Appropriate   Cranial Nerves:    CN II: Pupils are equal and react appropriately. No APD noted.    CN III, IV, VI: Good eye closure. Extraocular movements are intact.     CN V: Facial sensation is intact and symmetric.     CN VII: No evidence of facial droop.     CN VIII: Hearing is grossly intact.    CN IX and X: Palate elevates symmetrically.    CN XI: Shoulder shrug is symmetric.     CN XII: Tongue is midline.   Sensory: Sensation is intact to vibration and temperature.    Coordination: There is no discoordination detected with finger tapping or finger to nose testing.        DTR's: Reflexes are 2+ and symmetrical.   Babinski: Toes are downgoing bilaterally.    Romberg: Deferred.   Movements: No tremors noted.  Musculoskeletal:    Strength:   Deltoids      R 5/5 L 5/5  Biceps        R 5/5 L 5/5  Triceps       R 5/5 L 5/5  Wrist Ext    R 5/5 L 5/5  Finger Flex R 5/5 L 5/5  Finger Ext   R 5/5 L 5/5  Hip Flex      R 5/5 L 5/5  Knee Flex   R 5/5 L 5/5  Knee Ext    R 5/5 L 5/5  Ankle DF    R 5/5 L 5/5  Ankle PF    R 5/5 L 5/5   Gait: Gait is narrow based and steady.    Tone: Normal bulk and tone.    Joints: No joint swelling.   Psychiatric: Mood and affect are appropriate.     Labs Reviewed:   Results for MACIEJ CAMPBELL " (MRN [id number]) as of 7/27/2019 19:02   Ref. Range 6/11/2019 16:23   Number Of Tubes Unknown 3   Volume Latest Units: mL 12.9   Color-Body Fluid Unknown Colorless   Character-Body Fluid Unknown Clear   Supernatant Appearance Unknown Colorless   Total WBC Count Latest Ref Range: 0 - 10 cells/uL 1   Total RBC Count Latest Units: cells/uL 1   Crenated RBC Latest Units: % 0   Lymphs Latest Units: % 69   Mononuclear Cells - CSF Latest Units: % 31   CSF Tube Number Unknown 3   Glucose CSF Latest Ref Range: 40 - 80 mg/dL 64   Total Protein, CSF Latest Ref Range: 15 - 45 mg/dL 54 (H)   IgG CSF Latest Ref Range: 0.0 - 6.0 mg/dL 1.6   Results for MACIEJ CAMPBELL (MRN [id number]) as of 7/27/2019 19:02   Ref. Range 6/11/2019 16:23   Immunoglobulin G Latest Ref Range: 768 - 1632 mg/dL 684 (L)   B. Burgdorferi IgG WB Latest Ref Range: Negative  Negative   B. burgdorferi IgM Latest Ref Range: Negative  Negative   Syphilis, Treponemal Qual Latest Ref Range: Non Reactive  Non Reactive   SSA 52 (R0)(DENISHA) Ab, IgG Latest Ref Range: 0 - 40 AU/mL 1   SSA 60 (R0)(DENISHA) Ab, IgG Latest Ref Range: 0 - 40 AU/mL 2   Sjogren'S Anti-Ss-B Latest Ref Range: 0 - 40 AU/mL 0   CSF Albumin Latest Ref Range: 0 - 35 mg/dL 32   Serum Albumin Latest Ref Range: 3500 - 5200 mg/dL 4630   Albumin Ratio Latest Ref Range: 0.09 - 0.25 ratio 0.05 (L)   Albumin Index Latest Ref Range: 0.0 - 9.0 ratio 6.9   IgG Ratio Latest Ref Range: 0.28 - 0.66 ratio 0.34   Cns IgG Syn Latest Ref Range: <=8.0 mg/d <0.0   Oligoclonal Bands CSF Latest Ref Range: 0 - 1 Bands 0   Oligoclonal Bands CSF Latest Ref Range: Negative  Negative   Interpretation Unknown See Note         Assessment and Plan:     Abnormal brain MRI  Ms. Campbell is a 57 yo woman with pmhx of eosinophilic esophagitis, hypothyroidism/hyperthyroidism, anxiety, lumbar fusion, discectomy, and laminectomy procedures, and likely complicated migraine, who experienced left facial numbness and tingling spreading from her  right face into the entire right side of her body in February 2019.    Brain MRI imaging from [facility] February 4, 2019 showed periventricular white matter changes considered somewhat out of proportion for age raising the concern for possible underlying demyelinating disease.  Cervical spine MRI from June 7, 2018 did not show any evidence of demyelination. We additionally reviewed her thoracic spine MRI today which did not show any demyelination. Her CSF studies were also reviewed and did not show any oligoclonal bands in the CSF.    Given her headaches and stroke like episode, CADASIL/CARASIL was discussed, but she lost the information and order on the test.       Plan:  1.  Will wait for appointment with Dr. Solomon until submitting orders for genetic testing.   2. I will provide Dr. Solomon with Ms. Thrasher's MRI CD's for review.   3. Repeat brain MRI in 6-8 months, sooner if any new symptoms occur. She will need a new neurologist to follow her and assess for additional areas of demyeliantion.     Family history of Alzheimer's disease  She would like advice on genetic testing given family history of Alzheimer's. I recommended she consult with Dr. Solomon and have placed a referral for her. We can then order any recommended tests through [place].       Marlin Peterson D.O., M.P.H  MS specialist.   Board Certified Neurologist.  Neurology Clerkship Director, [facility].    Neurology,  [facility].   Tel: [phone]  Fax: [phone]

## 2019-07-27 PROBLEM — Z82.0 FAMILY HISTORY OF ALZHEIMER'S DISEASE: Status: ACTIVE | Noted: 2019-07-27

## 2019-07-27 ASSESSMENT — ENCOUNTER SYMPTOMS
BLURRED VISION: 0
DOUBLE VISION: 0
SHORTNESS OF BREATH: 0
MEMORY LOSS: 1
CHILLS: 0
BACK PAIN: 1
FEVER: 0
HEADACHES: 1
VOMITING: 0
NAUSEA: 0

## 2019-07-28 NOTE — ASSESSMENT & PLAN NOTE
She would like advice on genetic testing given family history of Alzheimer's. I recommended she consult with Dr. Solomon and have placed a referral for her. We can then order any recommended tests through [place].

## 2019-07-28 NOTE — ASSESSMENT & PLAN NOTE
Ms. Thrasher is a 57 yo woman with pmhx of eosinophilic esophagitis, hypothyroidism/hyperthyroidism, anxiety, lumbar fusion, discectomy, and laminectomy procedures, and likely complicated migraine, who experienced left facial numbness and tingling spreading from her right face into the entire right side of her body in February 2019.    Brain MRI imaging from [facility] February 4, 2019 showed periventricular white matter changes considered somewhat out of proportion for age raising the concern for possible underlying demyelinating disease.  Cervical spine MRI from June 7, 2018 did not show any evidence of demyelination. We additionally reviewed her thoracic spine MRI today which did not show any demyelination. Her CSF studies were also reviewed and did not show any oligoclonal bands in the CSF.    Given her headaches and stroke like episode, CADASIL/CARASIL was discussed, but she lost the information and order on the test.       Plan:  1.  Will wait for appointment with Dr. Solomon until submitting orders for genetic testing.   2. I will provide Dr. Solomon with Ms. Thrasher's MRI CD's for review.   3. Repeat brain MRI in 6-8 months, sooner if any new symptoms occur. She will need a new neurologist to follow her and assess for additional areas of demyeliantion.

## 2019-11-21 ENCOUNTER — OFFICE VISIT (OUTPATIENT)
Dept: NEUROLOGY | Facility: MEDICAL CENTER | Age: 59
End: 2019-11-21
Payer: MEDICAID

## 2019-11-21 ENCOUNTER — APPOINTMENT (OUTPATIENT)
Dept: NEUROLOGY | Facility: MEDICAL CENTER | Age: 59
End: 2019-11-21
Payer: MEDICAID

## 2019-11-21 VITALS
TEMPERATURE: 97.2 F | WEIGHT: 162.6 LBS | RESPIRATION RATE: 16 BRPM | HEIGHT: 61 IN | HEART RATE: 106 BPM | BODY MASS INDEX: 30.7 KG/M2 | DIASTOLIC BLOOD PRESSURE: 66 MMHG | SYSTOLIC BLOOD PRESSURE: 130 MMHG | OXYGEN SATURATION: 98 %

## 2019-11-21 DIAGNOSIS — R90.89 ABNORMAL BRAIN MRI: ICD-10-CM

## 2019-11-21 DIAGNOSIS — G31.09 OTHER FRONTOTEMPORAL DEMENTIA (CODE): ICD-10-CM

## 2019-11-21 DIAGNOSIS — G37.9 DEMYELINATING CHANGES IN BRAIN (HCC): ICD-10-CM

## 2019-11-21 DIAGNOSIS — R41.3 MEMORY LOSS: ICD-10-CM

## 2019-11-21 PROCEDURE — 99215 OFFICE O/P EST HI 40 MIN: CPT

## 2019-11-21 RX ORDER — MEMANTINE HYDROCHLORIDE 5 MG/1
5 TABLET ORAL 2 TIMES DAILY
Qty: 90 TAB | Refills: 3 | Status: SHIPPED | OUTPATIENT
Start: 2019-11-21 | End: 2022-12-19

## 2019-11-21 RX ORDER — FLUOXETINE 10 MG/1
CAPSULE ORAL
Refills: 2 | COMMUNITY
Start: 2019-11-11 | End: 2022-12-19

## 2019-11-21 NOTE — PROGRESS NOTES
Neurocogniitve Evaluation     Referred by Joaquín Alvarado MD for cognitive dysfunction    CC Memory loss, word finding difficulties, trouble focusing   Follow up  Used to follow with Dr meraz :     2005 she had hearing loss after a concussion during which she bled out of her left ear. She received bilateral hearing aids.      She continues to experience headaches which seem cervicogenic in etiology with pain originating at the base of her occipital area radiating over her head and behind the eyes. Frequency is weekly. She takes Ibuprofen which does not help. She denies clear photophobia, phonophobia, or nausea. She has experienced migraines in the past, but has not had a migraine for several years. At age 20, she had migraine with aura. These recurred in [id number]. There has only been one very severe headache.      She denies consistent memory impairment. At work, in the past, she would have difficulty remembering what she needed to do and would have to write everything down. 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 She is mostly concerned because she has a family history of what she believes to be Alzheimer's dementia in her mother and grandmother and is convinced she may develop this herself.     Trouble finding words when she speaks,knows what she wants to say.  Hearing loss and vision changes are bothering her. Both ears- she is having them checked.  Family history of alzheimer's disease mom had it   In 2010 pancreatic pseudocyst   MRI brain with WM abnormalities which were thought to may have represent MS however this was not the case after spinal tap and negative MS workup.  Short term memory loss - she forgets names and appointments  Can still do all her ADL's no issues driving cooking paying bills.  Some anxiety regarding FH of AD as she believes this may be familiar?    Review of Systems   Constitutional: Positive for malaise/fatigue.   HENT: Negative.    Eyes: Positive for  blurred vision.   Respiratory: Negative.    Cardiovascular: Negative.    Gastrointestinal: Negative.    Genitourinary: Negative.    Musculoskeletal: Negative.    Skin: Negative.    Neurological: Positive for tingling, sensory change, speech change and headaches.   Endo/Heme/Allergies: Negative.    Psychiatric/Behavioral: Positive for memory loss. The patient is nervous/anxious.      Past Medical History:   Diagnosis Date   • Anxiety 6/27/2012   • GERD (gastroesophageal reflux disease) 6/27/2012   • Hyperlipidemia 6/27/2012   • Hypothyroidism 6/27/2012   • Psychiatric problem     anxiety, improved since pt left job   • Seasonal allergies 6/27/2012     Past Surgical History:   Procedure Laterality Date   • BLOCK SELECTIVE NERVE Right 5/23/2019    Procedure: Right L2 selective nerve root block under fluoroscopy.  **LATEX ALLERGY**;  Surgeon: Imer Chang D.O.;  Location: [facility];  Service: Orthopedics   • BLOCK SELECTIVE NERVE Right 4/18/2019    Procedure: Right L3 selective nerve root block under fluoroscopy;  Surgeon: Imer Chang D.O.;  Location: [facility];  Service: Orthopedics   • CHOLECYSTECTOMY     • LAMINOTOMY     • LUMBAR FUSION POSTERIOR     • MENISCUS REPAIR Left      Family History   Problem Relation Age of Onset   • Dementia Mother         Alzheimer's Type   • Heart Disease Mother         AAA   • Cancer Father         Lung   • Heart Disease Sister    • Thyroid Sister    • Diabetes Sister    • Rheumatologic Disease Sister         RA   • Heart Disease Sister    • Alcohol/Drug Sister    • No Known Problems Daughter      Social History     Socioeconomic History   • Marital status: Single     Spouse name: Not on file   • Number of children: Not on file   • Years of education: Not on file   • Highest education level: Not on file   Occupational History   • Not on file   Social Needs   • Financial resource strain: Not on file   • Food insecurity:     Worry: Not on file     Inability:  Not on file   • Transportation needs:     Medical: Not on file     Non-medical: Not on file   Tobacco Use   • Smoking status: Never Smoker   • Smokeless tobacco: Never Used   Substance and Sexual Activity   • Alcohol use: No     Comment: rare   • Drug use: No   • Sexual activity: Yes     Partners: Male   Lifestyle   • Physical activity:     Days per week: Not on file     Minutes per session: Not on file   • Stress: Not on file   Relationships   • Social connections:     Talks on phone: Not on file     Gets together: Not on file     Attends Jewish service: Not on file     Active member of club or organization: Not on file     Attends meetings of clubs or organizations: Not on file     Relationship status: Not on file   • Intimate partner violence:     Fear of current or ex partner: Not on file     Emotionally abused: Not on file     Physically abused: Not on file     Forced sexual activity: Not on file   Other Topics Concern   • Not on file   Social History Narrative   • Not on file     Current Outpatient Medications on File Prior to Visit   Medication Sig Dispense Refill   • FLUoxetine (PROZAC) 10 MG Cap TAKE 1 CAPSULE BY MOUTH ONCE DAILY FOR 30 DAYS  2   • Multiple Vitamins-Minerals (HAIR/SKIN/NAILS/BIOTIN PO) Take  by mouth.     • estradiol cypionate (DEPO-ESTRADIOL) 5 MG/ML Oil by Intramuscular route Once.     • medroxyPROGESTERone (PROVERA) 2.5 MG Tab Take 2.5 mg by mouth every day.     • acyclovir (ZOVIRAX) 200 MG Cap Take 400 mg by mouth 2 Times a Day.     • ipratropium (ATROVENT) 0.02 % SOLN 2.5 mL by Nebulization route 4 times a day. 50 Each 1   • albuterol (PROVENTIL) 2.5mg/3ml NEBU 3 mL by Nebulization route every 6 hours as needed. 75 mL 2   • fluticasone (FLONASE) 50 MCG/ACT nasal spray Spray 2 Sprays in nose every day. Each Nostril 16 g 3   • amitriptyline (ELAVIL) 50 MG TABS Take 100 mg by mouth every bedtime.     • albuterol (VENTOLIN HFA) 108 (90 BASE) MCG/ACT AERS Inhale 2 Puffs by mouth every  "four hours as needed.     • ibuprofen (MOTRIN) 800 MG TABS Take 800 mg by mouth every 6 hours as needed.     • Cetirizine HCl (ZYRTEC ALLERGY PO) Take  by mouth.     • gemfibrozil (LOPID) 600 MG TABS Take 600 mg by mouth 2 times a day.     • alprazolam (XANAX) 0.5 MG TABS Take 0.5 mg by mouth 1 time daily as needed.     • omeprazole (PRILOSEC) 20 MG CPDR Take 20 mg by mouth every day.     • doxycycline (VIBRAMYCIN) 100 MG TABS Take 1 tablet twice daily for 10 days (Patient not taking: Reported on 4/23/2019) 20 Tab 0   • levothyroxine (SYNTHROID) 25 MCG TABS Take 25 mcg by mouth every day.     • docosahexanoic acid (OMEGA 3 FA) 1000 MG CAPS Take 1,000 mg by mouth 2 Times a Day.       No current facility-administered medications on file prior to visit.      Encounter Vitals  Standard Vitals  Vitals  Blood Pressure: 130/66  Temperature: 36.2 °C (97.2 °F)  Temp src: Temporal  Pulse: (!) 106  Respiration: 16  Pulse Oximetry: 98 %  Height: 154.9 cm (5' 1\")  Weight: 73.8 kg (162 lb 9.6 oz)  Encounter Vitals  Temperature: 36.2 °C (97.2 °F)  Temp src: Temporal  Blood Pressure: 130/66  Pulse: (!) 106  Respiration: 16  Pulse Oximetry: 98 %  Weight: 73.8 kg (162 lb 9.6 oz)  Height: 154.9 cm (5' 1\")  BMI (Calculated): 30.72  Physical exam  Constitutional: Well-developed, well-nourished, good hygiene. Appears stated age.  Cardiovascular: RRR, with no murmurs, rubs or gallops. No carotid bruits.   Respiratory: Lungs CTA B/L, no W/R/R.   Abdomen: Soft Non-tender to Palpation. Non-distended.  Extremities: No peripheral edema, pedal pulses intact.   Skin: Warm, dry, intact. No rashes observed.  Eyes: Sclera anicteric. No nystagmus observed.   Neurologic:              Mental Status: Awake and alert.               Speech: Speech is fluent with normal prosody.   .           Fund of Knowledge: Appropriate              Cranial Nerves:                          CN II: Pupils are equal and react appropriately. No APD noted.                   "        CN III, IV, VI: Good eye closure. Extraocular movements are intact.                           CN V: Facial sensation is intact and symmetric.                           CN VII: No evidence of facial droop.                           CN VIII: Hearing is grossly intact.                          CN IX and X: Palate elevates symmetrically.                          CN XI: Shoulder shrug is symmetric.                           CN XII: Tongue is midline.              Sensory: Sensation is intact to vibration and temperature.               Coordination: There is no discoordination detected with finger tapping or finger to nose testing.                   DTR's: Reflexes are 2+ and symmetrical.              Babinski: Toes are downgoing bilaterally.               Romberg: Deferred.              Movements: No tremors noted.  Musculoskeletal:               Strength:   Deltoids      R 5/5 L 5/5  Biceps        R 5/5 L 5/5  Triceps       R 5/5 L 5/5  Wrist Ext    R 5/5 L 5/5  Finger Flex R 5/5 L 5/5  Finger Ext   R 5/5 L 5/5  Hip Flex      R 5/5 L 5/5  Knee Flex   R 5/5 L 5/5  Knee Ext    R 5/5 L 5/5  Ankle DF    R 5/5 L 5/5  Ankle PF    R 5/5 L 5/5              Gait: Gait is narrow based and steady.               Tone: Normal bulk and tone.               Joints: No joint swelling.   Psychiatric: Mood and affect are appropriate.        Lab Results   Component Value Date/Time    SODIUM 143 07/04/2010 05:00 AM    POTASSIUM 3.9 07/04/2010 05:00 AM    CHLORIDE 107 07/04/2010 05:00 AM    CO2 27 07/04/2010 05:00 AM    GLUCOSE 97 07/04/2010 05:00 AM    BUN <5 (L) 07/04/2010 05:00 AM    CREATININE 0.55 07/04/2010 05:00 AM      Lab Results   Component Value Date/Time    WBC 7.4 06/11/2019 11:42 AM    RBC 4.63 06/11/2019 11:42 AM    HEMOGLOBIN 14.2 06/11/2019 11:42 AM    HEMATOCRIT 41.5 06/11/2019 11:42 AM    MCV 89.6 06/11/2019 11:42 AM    MCH 30.7 06/11/2019 11:42 AM    MCHC 34.2 06/11/2019 11:42 AM    MPV 9.5 06/11/2019 11:42 AM     NEUTSPOLYS 47.40 06/11/2019 11:42 AM    LYMPHOCYTES 22.40 06/11/2019 11:42 AM    MONOCYTES 6.50 06/11/2019 11:42 AM    EOSINOPHILS 21.10 (H) 06/11/2019 11:42 AM    BASOPHILS 2.30 (H) 06/11/2019 11:42 AM      Imaging  Reviewed prior MRI brain   This does not appear to be consistent with CADASIL     MOCA 25/30     Laurenceon and plan   Ms. Thrasher is a 57 yo woman with pmhx of eosinophilic esophagitis, hypothyroidism/hyperthyroidism, anxiety, lumbar fusion, discectomy, and laminectomy procedures, and likely complicated migraine, who experienced left facial numbness and tingling spreading from her right face into the entire right side of her body in February 2019.     Brain MRI imaging from [facility] February 4, 2019 showed periventricular white matter changes considered somewhat out of proportion for age raising the concern for possible underlying demyelinating disease.  Cervical spine MRI from June 7, 2018 did not show any evidence of demyelination. We additionally reviewed her thoracic spine MRI today which did not show any demyelination. Her CSF studies were also reviewed and did not show any oligoclonal bands in the CSF.  Her MOCA is 25/30 with some anxiety related to FH of AD I had discussed this is very rare. Imaging in my opinion does not raise concern for CADASIL however will order new MRI brain.    Plan  Referral to neuropsychology for cognitive testing   MRI brain   PET FDG  memantine 5 mg bid   This may be pseudodementia vs amnestic MCI vs other   We will compare prior and new MRI brain for any new stroke like lesions.  Check B12, folate TSH     RTC after above

## 2019-11-25 ASSESSMENT — ENCOUNTER SYMPTOMS
TINGLING: 1
SPEECH CHANGE: 1
MUSCULOSKELETAL NEGATIVE: 1
BLURRED VISION: 1
CARDIOVASCULAR NEGATIVE: 1
MEMORY LOSS: 1
RESPIRATORY NEGATIVE: 1
GASTROINTESTINAL NEGATIVE: 1
NERVOUS/ANXIOUS: 1
SENSORY CHANGE: 1
HEADACHES: 1

## 2019-11-29 ENCOUNTER — PATIENT MESSAGE (OUTPATIENT)
Dept: NEUROLOGY | Facility: MEDICAL CENTER | Age: 59
End: 2019-11-29

## 2019-12-02 ENCOUNTER — PATIENT MESSAGE (OUTPATIENT)
Dept: NEUROLOGY | Facility: MEDICAL CENTER | Age: 59
End: 2019-12-02

## 2019-12-02 NOTE — PATIENT COMMUNICATION
memantine (NAMENDA) 5 MG Tab 90 Tab 3/3 11/21/2019     Sig - Route: Take 1 Tab by mouth 2 times a day. - Oral    Sent to pharmacy as: Memantine HCl 5 MG Oral Tablet (NAMENDA)      Called script to [facility] at patient's request. Gave #[id number]  90 day supply  with 2 refills.

## 2019-12-18 ENCOUNTER — HOSPITAL ENCOUNTER (OUTPATIENT)
Dept: RADIOLOGY | Facility: MEDICAL CENTER | Age: 59
End: 2019-12-18
Payer: MEDICAID

## 2019-12-18 DIAGNOSIS — G31.09 OTHER FRONTOTEMPORAL DEMENTIA (CODE): ICD-10-CM

## 2019-12-18 PROCEDURE — A9552 F18 FDG: HCPCS

## 2019-12-21 ENCOUNTER — HOSPITAL ENCOUNTER (OUTPATIENT)
Dept: RADIOLOGY | Facility: MEDICAL CENTER | Age: 59
End: 2019-12-21
Payer: MEDICAID

## 2019-12-21 DIAGNOSIS — G37.9 DEMYELINATING CHANGES IN BRAIN (HCC): ICD-10-CM

## 2019-12-21 DIAGNOSIS — R90.89 ABNORMAL BRAIN MRI: ICD-10-CM

## 2019-12-21 DIAGNOSIS — R41.3 MEMORY LOSS: ICD-10-CM

## 2019-12-21 PROCEDURE — 700117 HCHG RX CONTRAST REV CODE 255

## 2019-12-21 PROCEDURE — 70553 MRI BRAIN STEM W/O & W/DYE: CPT

## 2019-12-21 PROCEDURE — A9576 INJ PROHANCE MULTIPACK: HCPCS

## 2019-12-21 RX ADMIN — GADOTERIDOL 15 ML: 279.3 INJECTION, SOLUTION INTRAVENOUS at 13:10

## 2020-02-18 ENCOUNTER — OFFICE VISIT (OUTPATIENT)
Dept: NEUROLOGY | Facility: MEDICAL CENTER | Age: 60
End: 2020-02-18
Payer: MEDICAID

## 2020-02-18 VITALS
DIASTOLIC BLOOD PRESSURE: 70 MMHG | HEIGHT: 61 IN | WEIGHT: 174 LBS | SYSTOLIC BLOOD PRESSURE: 128 MMHG | OXYGEN SATURATION: 94 % | TEMPERATURE: 97.5 F | RESPIRATION RATE: 16 BRPM | BODY MASS INDEX: 32.85 KG/M2 | HEART RATE: 114 BPM

## 2020-02-18 DIAGNOSIS — R90.89 ABNORMAL BRAIN MRI: ICD-10-CM

## 2020-02-18 DIAGNOSIS — R41.3 MEMORY LOSS: ICD-10-CM

## 2020-02-18 DIAGNOSIS — Z82.0 FAMILY HISTORY OF ALZHEIMER'S DISEASE: ICD-10-CM

## 2020-02-18 PROCEDURE — 99213 OFFICE O/P EST LOW 20 MIN: CPT

## 2020-02-18 RX ORDER — DONEPEZIL HYDROCHLORIDE 5 MG/1
5 TABLET, FILM COATED ORAL EVERY EVENING
Qty: 90 TAB | Refills: 3 | Status: SHIPPED | OUTPATIENT
Start: 2020-02-18 | End: 2022-12-19

## 2020-02-18 RX ORDER — PANTOPRAZOLE SODIUM 40 MG/1
TABLET, DELAYED RELEASE ORAL
COMMUNITY
Start: 2020-02-05

## 2020-02-18 RX ORDER — TRAMADOL HYDROCHLORIDE 50 MG/1
50 TABLET ORAL EVERY 6 HOURS PRN
COMMUNITY
Start: 2020-02-04 | End: 2022-12-19

## 2020-02-19 NOTE — PROGRESS NOTES
"CC: Follow up for memory loss       HPI:  Siobhan is here for a follow up after her MRI brain and PET scan.  She has memory loss, ADD and would sometimes be \"short tempered\".  Her main problem remains short term memory loss, would forget names, appointments, asks same questions over and over again.  She has anxiety which is now improved. Mild depression.  No true disinhibited or odd behavior, no hallucinations. No tremors, shuffling,no parkinsonism.  Her mom has AD and is now in her 80's. She developed the symptoms 10 years ago. Sister has alcohol problem.    ROS:   Constitutional: No fevers or chills.  Eyes: No blurry vision or eye pain.  ENT: No dysphagia or hearing loss.  Respiratory: No cough or shortness of breath.  Cardiovascular: No chest pain or palpitations.  GI: No nausea, vomiting, or diarrhea.  : No urinary incontinence or dysuria.  Musculoskeletal: No joint swelling or arthralgias.  Skin: No skin rashes.  Neuro: as above.  Endocrine: No heat or cold intolerance. No polydipsia or polyuria.  Psych: as above   Heme/Lymph: No easy bruising or swollen lymph nodes.      Past Medical History:   Past Medical History:   Diagnosis Date   • Anxiety 6/27/2012   • GERD (gastroesophageal reflux disease) 6/27/2012   • Hyperlipidemia 6/27/2012   • Hypothyroidism 6/27/2012   • Psychiatric problem     anxiety, improved since pt left job   • Seasonal allergies 6/27/2012       Past Surgical History:  Past Surgical History:   Procedure Laterality Date   • BLOCK SELECTIVE NERVE Right 5/23/2019    Procedure: Right L2 selective nerve root block under fluoroscopy.  **LATEX ALLERGY**;  Surgeon: Imer Chang D.O.;  Location: SURGERY [facility];  Service: Orthopedics   • BLOCK SELECTIVE NERVE Right 4/18/2019    Procedure: Right L3 selective nerve root block under fluoroscopy;  Surgeon: Imer Chang D.O.;  Location: SURGERY [facility];  Service: Orthopedics   • CHOLECYSTECTOMY     • LAMINOTOMY     • LUMBAR FUSION POSTERIOR   "   • MENISCUS REPAIR Left        Social History:   Social History     Socioeconomic History   • Marital status: Single     Spouse name: Not on file   • Number of children: Not on file   • Years of education: Not on file   • Highest education level: Not on file   Occupational History   • Not on file   Social Needs   • Financial resource strain: Not on file   • Food insecurity     Worry: Not on file     Inability: Not on file   • Transportation needs     Medical: Not on file     Non-medical: Not on file   Tobacco Use   • Smoking status: Never Smoker   • Smokeless tobacco: Never Used   Substance and Sexual Activity   • Alcohol use: No     Comment: rare   • Drug use: No   • Sexual activity: Yes     Partners: Male   Lifestyle   • Physical activity     Days per week: Not on file     Minutes per session: Not on file   • Stress: Not on file   Relationships   • Social connections     Talks on phone: Not on file     Gets together: Not on file     Attends [employer] service: Not on file     Active member of club or organization: Not on file     Attends meetings of clubs or organizations: Not on file     Relationship status: Not on file   • Intimate partner violence     Fear of current or ex partner: Not on file     Emotionally abused: Not on file     Physically abused: Not on file     Forced sexual activity: Not on file   Other Topics Concern   • Not on file   Social History Narrative   • Not on file       Family History:   Family History   Problem Relation Age of Onset   • Dementia Mother         Alzheimer's Type   • Heart Disease Mother         AAA   • Cancer Father         Lung   • Heart Disease Sister    • Thyroid Sister    • Diabetes Sister    • Rheumatologic Disease Sister         RA   • Heart Disease Sister    • Alcohol/Drug Sister    • No Known Problems Daughter        Allergies: [unfilled]    Physical Exam:     [unfilled]  Constitutional: Well-developed, well-nourished, good hygiene. Appears stated age.  Cardiovascular:  RRR, with no murmurs, rubs or gallops. No carotid bruits.   Respiratory: Lungs CTA B/L, no W/R/R.   Abdomen: Soft Non-tender to Palpation. Non-distended.  Extremities: No peripheral edema, pedal pulses intact.   Skin: Warm, dry, intact. No rashes observed.  Eyes: Sclera anicteric   Funduscopic: Optic discs flat with no evidence of papilledema or pallor.   Neurologic:   Mental Status: Awake, alert, oriented x 3.   Speech: Fluent with normal prosody.   Memory: Able to recall recent and remote events accurately.    Concentration: Attentive. Able to focus on history and follow multi-step commands.              Fund of Knowledge: Appropriate   Cranial Nerves:    CN II: PERRL. No afferent pupillary defect.    CN III, IV, VI: Good eye closure, EOMI.     CN V: Facial sensation intact and symmetric.     CN VII: No facial asymmetry.     CN VIII: Hearing intact.     CN IX and X: Palate elevates symmetrically. Normal gag reflex.    CN XI: Symmetric shoulder shrug.     CN XII: Tongue midline.    Sensory: Intact light touch, vibration and temperature.    Coordination: No evidence of past-pointing on finger to nose testing, no dysdiadochokinesia. Heel to shin intact.             DTR's: 2+ throughout without clonus.    Babinski: Toes downgoing bilaterally.   Romberg: Negative.   Movements: No tremors observed.   Musculoskeletal:    Strength: 5/5 in upper and lower extremities bilaterally.   Gait: Steady, narrow based.    Tone: Normal bulk and tone.   Joints: No swelling.     Labs:  Lab Results   Component Value Date/Time    SODIUM 143 07/04/2010 05:00 AM    POTASSIUM 3.9 07/04/2010 05:00 AM    CHLORIDE 107 07/04/2010 05:00 AM    CO2 27 07/04/2010 05:00 AM    GLUCOSE 97 07/04/2010 05:00 AM    BUN <5 (L) 07/04/2010 05:00 AM    CREATININE 0.55 07/04/2010 05:00 AM      Lab Results   Component Value Date/Time    WBC 7.4 06/11/2019 11:42 AM    RBC 4.63 06/11/2019 11:42 AM    HEMOGLOBIN 14.2 06/11/2019 11:42 AM    HEMATOCRIT 41.5  06/11/2019 11:42 AM    MCV 89.6 06/11/2019 11:42 AM    MCH 30.7 06/11/2019 11:42 AM    MCHC 34.2 06/11/2019 11:42 AM    MPV 9.5 06/11/2019 11:42 AM    NEUTSPOLYS 47.40 06/11/2019 11:42 AM    LYMPHOCYTES 22.40 06/11/2019 11:42 AM    MONOCYTES 6.50 06/11/2019 11:42 AM    EOSINOPHILS 21.10 (H) 06/11/2019 11:42 AM    BASOPHILS 2.30 (H) 06/11/2019 11:42 AM      Current Outpatient Medications on File Prior to Visit   Medication Sig Dispense Refill   • tramadol (ULTRAM) 50 MG Tab Take 50 mg by mouth every 6 hours as needed.     • pantoprazole (PROTONIX) 40 MG Tablet Delayed Response      • mupirocin (BACTROBAN) 2 % Ointment      • Multiple Vitamins-Minerals (HAIR/SKIN/NAILS/BIOTIN PO) Take  by mouth.     • estradiol cypionate (DEPO-ESTRADIOL) 5 MG/ML Oil by Intramuscular route Once.     • medroxyPROGESTERone (PROVERA) 2.5 MG Tab Take 2.5 mg by mouth every day.     • acyclovir (ZOVIRAX) 200 MG Cap Take 400 mg by mouth 2 Times a Day.     • ipratropium (ATROVENT) 0.02 % SOLN 2.5 mL by Nebulization route 4 times a day. 50 Each 1   • albuterol (PROVENTIL) 2.5mg/3ml NEBU 3 mL by Nebulization route every 6 hours as needed. 75 mL 2   • amitriptyline (ELAVIL) 50 MG TABS Take 100 mg by mouth every bedtime.     • albuterol (VENTOLIN HFA) 108 (90 BASE) MCG/ACT AERS Inhale 2 Puffs by mouth every four hours as needed.     • ibuprofen (MOTRIN) 800 MG TABS Take 800 mg by mouth every 6 hours as needed.     • Cetirizine HCl (ZYRTEC ALLERGY PO) Take  by mouth.     • gemfibrozil (LOPID) 600 MG TABS Take 600 mg by mouth 2 times a day.     • alprazolam (XANAX) 0.5 MG TABS Take 0.5 mg by mouth 1 time daily as needed.     • omeprazole (PRILOSEC) 20 MG CPDR Take 20 mg by mouth every day.     • FLUoxetine (PROZAC) 10 MG Cap TAKE 1 CAPSULE BY MOUTH ONCE DAILY FOR 30 DAYS  2   • memantine (NAMENDA) 5 MG Tab Take 1 Tab by mouth 2 times a day. (Patient not taking: Reported on 2/18/2020) 90 Tab 3   • doxycycline (VIBRAMYCIN) 100 MG TABS Take 1 tablet  twice daily for 10 days (Patient not taking: Reported on 4/23/2019) 20 Tab 0   • fluticasone (FLONASE) 50 MCG/ACT nasal spray Spray 2 Sprays in nose every day. Each Nostril (Patient not taking: Reported on 2/18/2020) 16 g 3   • levothyroxine (SYNTHROID) 25 MCG TABS Take 25 mcg by mouth every day.     • docosahexanoic acid (OMEGA 3 FA) 1000 MG CAPS Take 1,000 mg by mouth 2 Times a Day.       No current facility-administered medications on file prior to visit.      Imaging:   Reviewed all imaging with Siobhan   MRI brain wnonspecific and mild generalized atrophy     PET scan hypometabolism is temporal and frontal  lobes   Suggestive of FTD vs atypical AD?  She also has ADD and fronal hypometabolism is common.    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  PET scan with frontal temporal hypometabolism - she has no typical symptoms of FTD and MOCA is not suggestive   This could be from ADD pattern and also could potentially represent atypical AD   We will await neuropsychological testing as she has this in march.  I do not exclude contribution of anxiety and ADD to her cognitive decline.  Start donepezil 5 mg nightly for 30 days discussed SE including nausea, vivid dreams etc if tolerated well can increase to 10 mg nightly in 4 weeks.    RTC after Neuropsychological testing

## 2022-06-28 ENCOUNTER — HOSPITAL ENCOUNTER (OUTPATIENT)
Dept: RADIOLOGY | Facility: MEDICAL CENTER | Age: 62
End: 2022-06-28
Attending: CLINICAL NURSE SPECIALIST
Payer: MEDICARE

## 2022-07-06 ENCOUNTER — HOSPITAL ENCOUNTER (OUTPATIENT)
Dept: RADIOLOGY | Facility: MEDICAL CENTER | Age: 62
End: 2022-07-06
Attending: CLINICAL NURSE SPECIALIST
Payer: MEDICARE

## 2022-07-06 DIAGNOSIS — R41.3 MEMORY LOSS: ICD-10-CM

## 2022-07-06 PROCEDURE — A9552 F18 FDG: HCPCS

## 2022-11-09 ENCOUNTER — PATIENT MESSAGE (OUTPATIENT)
Dept: HEALTH INFORMATION MANAGEMENT | Facility: OTHER | Age: 62
End: 2022-11-09

## 2025-01-21 PROBLEM — M54.14 THORACIC RADICULOPATHY: Status: ACTIVE | Noted: 2025-01-21

## 2025-02-20 ENCOUNTER — APPOINTMENT (OUTPATIENT)
Dept: NEUROLOGY | Facility: MEDICAL CENTER | Age: 65
End: 2025-02-20
Attending: PSYCHIATRY & NEUROLOGY
Payer: MEDICARE